# Patient Record
Sex: FEMALE | Race: WHITE | URBAN - METROPOLITAN AREA
[De-identification: names, ages, dates, MRNs, and addresses within clinical notes are randomized per-mention and may not be internally consistent; named-entity substitution may affect disease eponyms.]

---
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
## 2020-11-14 NOTE — PROCEDURE: LIQUID NITROGEN
Detail Level: Simple
OBS GOALS    -diagnostic tests and consults completed and resulted NOT MET  -vital signs normal or at patient baseline MET  -tolerating oral intake to maintain hydration PARTIALLY MET  -adequate pain control on oral analgesics MET  -returns to baseline functional status NOT MET  -safe disposition plan has been identified NOT MET    
Medical Necessity Information: It is in your best interest to select a reason for this procedure from the list below. All of these items fulfill various CMS LCD requirements except the new and changing color options.
Add 52 Modifier (Optional): no
Post-Care Instructions: I reviewed with the patient in detail post-care instructions. Patient is to wear sunprotection, and avoid picking at any of the treated lesions. Patient may apply Vaseline to crusted or scabbing areas.
Medical Necessity Clause: This procedure was medically necessary because the lesions that were treated were:
Consent: The patient's (or patient's guardian's) consent was obtained including but not limited to risks of crusting, scabbing, blistering, scarring, nerve damage, darker or lighter pigmentary change, recurrence, incomplete removal and infection.
Number Of Freeze-Thaw Cycles: 2 freeze-thaw cycles
Duration Of Freeze Thaw-Cycle (Seconds): 5